# Patient Record
Sex: FEMALE | Race: WHITE | NOT HISPANIC OR LATINO | Employment: UNEMPLOYED | URBAN - METROPOLITAN AREA
[De-identification: names, ages, dates, MRNs, and addresses within clinical notes are randomized per-mention and may not be internally consistent; named-entity substitution may affect disease eponyms.]

---

## 2024-03-10 ENCOUNTER — HOSPITAL ENCOUNTER (EMERGENCY)
Facility: HOSPITAL | Age: 54
Discharge: HOME | End: 2024-03-10
Attending: EMERGENCY MEDICINE | Admitting: EMERGENCY MEDICINE
Payer: COMMERCIAL

## 2024-03-10 ENCOUNTER — APPOINTMENT (OUTPATIENT)
Dept: GENERAL RADIOLOGY | Facility: HOSPITAL | Age: 54
End: 2024-03-10
Payer: COMMERCIAL

## 2024-03-10 VITALS
BODY MASS INDEX: 34.78 KG/M2 | DIASTOLIC BLOOD PRESSURE: 67 MMHG | HEART RATE: 84 BPM | RESPIRATION RATE: 19 BRPM | WEIGHT: 189 LBS | SYSTOLIC BLOOD PRESSURE: 125 MMHG | OXYGEN SATURATION: 95 % | HEIGHT: 62 IN | TEMPERATURE: 98.1 F

## 2024-03-10 DIAGNOSIS — J10.1 INFLUENZA A: Primary | ICD-10-CM

## 2024-03-10 LAB
FLUAV RNA RESP QL NAA+PROBE: DETECTED
FLUBV RNA RESP QL NAA+PROBE: NOT DETECTED
SARS-COV-2 RNA RESP QL NAA+PROBE: NOT DETECTED

## 2024-03-10 PROCEDURE — 71046 X-RAY EXAM CHEST 2 VIEWS: CPT

## 2024-03-10 PROCEDURE — 87636 SARSCOV2 & INF A&B AMP PRB: CPT | Performed by: EMERGENCY MEDICINE

## 2024-03-10 PROCEDURE — 99283 EMERGENCY DEPT VISIT LOW MDM: CPT

## 2024-03-10 RX ORDER — BENZONATATE 200 MG/1
200 CAPSULE ORAL 3 TIMES DAILY PRN
Qty: 20 CAPSULE | Refills: 0 | Status: SHIPPED | OUTPATIENT
Start: 2024-03-10

## 2024-03-10 RX ORDER — BENZONATATE 100 MG/1
200 CAPSULE ORAL ONCE
Status: COMPLETED | OUTPATIENT
Start: 2024-03-10 | End: 2024-03-10

## 2024-03-10 RX ORDER — IBUPROFEN 600 MG/1
600 TABLET ORAL EVERY 6 HOURS PRN
Qty: 20 TABLET | Refills: 0 | Status: SHIPPED | OUTPATIENT
Start: 2024-03-10

## 2024-03-10 RX ADMIN — BENZONATATE 200 MG: 100 CAPSULE ORAL at 21:55

## 2024-03-11 NOTE — ED PROVIDER NOTES
"Subjective     History provided by:  Patient    History of Present Illness    Chief complaint: Shortness of breath and cough    Location: Lungs    Quality/Severity: The patient has been short of breath and has had a nonproductive cough.  She states the cough goes in severe spells.    Timing/Onset: Started Thursday 3 days ago.    Modifying Factors: Exertion exacerbates the shortness of breath.  Coughing exacerbates the shortness of breath.    Associated symptoms: Reports a subjective fever.  Denies runny nose or sore throat.    Narrative: The patient is a 53-year-old white female complaining of a 3-day history of a nonproductive cough and shortness of breath.  She is a pack-a-day smoker for 35 years and states she has a history of asthma for which she takes albuterol inhaler which has not been helping.  She has had the COVID-vaccine back in the early days of COVID and is contracted COVID twice.  She has not been vaccinated against the flu.  She works cleaning houses.    Review of Systems  Past Medical History:   Diagnosis Date    Asthma     Diabetes mellitus, type 2      /67 (BP Location: Right arm, Patient Position: Sitting)   Pulse 84   Temp 98.1 °F (36.7 °C) (Oral)   Resp 19   Ht 157.5 cm (62\")   Wt 85.7 kg (189 lb)   LMP  (LMP Unknown)   SpO2 95%   BMI 34.57 kg/m²     Past Medical History:   Diagnosis Date    Asthma     Diabetes mellitus, type 2        No Known Allergies    Past Surgical History:   Procedure Laterality Date     SECTION      HYSTERECTOMY      OOPHORECTOMY      Removed one ovary with Hysterectomy       Family History   Problem Relation Age of Onset    Lung cancer Father     Lymphoma Maternal Grandmother     Heart disease Maternal Grandfather        Social History     Socioeconomic History    Marital status: Single   Tobacco Use    Smoking status: Every Day     Current packs/day: 1.00     Average packs/day: 1 pack/day for 35.0 years (35.0 ttl pk-yrs)     Types: Cigarettes     " Start date: 3/10/1989   Substance and Sexual Activity    Alcohol use: Never    Drug use: Never    Sexual activity: Defer           Objective   Physical Exam  Vitals and nursing note reviewed.   Constitutional:       General: She is not in acute distress.     Appearance: She is well-developed. She is obese. She is not ill-appearing, toxic-appearing or diaphoretic.      Comments: The patient appears in no acute distress and does not appear ill.  Review of her vital signs: She is afebrile with a temperature 98.1, slightly tachypnea with a respiratory rate of 20 with a normal room air oxygen saturation of 96%, slightly tachycardic with heart rate 108, blood pressure elevated 135/104.   HENT:      Head: Normocephalic and atraumatic.      Nose: Nose normal.      Mouth/Throat:      Mouth: Mucous membranes are moist.      Pharynx: Oropharynx is clear.   Eyes:      General: No scleral icterus.        Right eye: No discharge.         Left eye: No discharge.      Pupils: Pupils are equal, round, and reactive to light.   Neck:      Thyroid: No thyromegaly.      Vascular: No JVD.   Cardiovascular:      Rate and Rhythm: Normal rate and regular rhythm.      Heart sounds: Normal heart sounds. No murmur heard.  Pulmonary:      Effort: Pulmonary effort is normal.      Breath sounds: Normal breath sounds. No decreased breath sounds, wheezing, rhonchi or rales.   Chest:      Chest wall: No tenderness.   Abdominal:      General: Bowel sounds are normal. There is no distension.      Palpations: Abdomen is soft.      Tenderness: There is no abdominal tenderness.   Musculoskeletal:         General: No tenderness or deformity. Normal range of motion.      Cervical back: Normal range of motion and neck supple.   Lymphadenopathy:      Cervical: No cervical adenopathy.   Skin:     General: Skin is warm and dry.      Findings: No rash.   Neurological:      General: No focal deficit present.      Mental Status: She is alert and oriented to  person, place, and time.      Cranial Nerves: No cranial nerve deficit.      Coordination: Coordination normal.      Comments: No focal motor sensory deficit   Psychiatric:         Behavior: Behavior normal.         Thought Content: Thought content normal.         Judgment: Judgment normal.         Procedures           ED Course  ED Course as of 03/10/24 2213   Sun Mar 10, 2024   2145 The radiologist and my interpretation of the patient's chest x-ray was normal.  The patient's COVID PCR is negative.  Influenza PCR is positive for influenza A. [TP]   2149 The patient's been symptomatic for 3 days placing her out of the timeframe to prescribe Tamiflu.  She will be prescribed ibuprofen and Tessalon. [TP]      ED Course User Index  [TP] Vidal Bueno MD                                             Medical Decision Making  My differential diagnosis for cough includes but is not limited to:  Upper respiratory infection, bronchitis, pneumonia, COPD exacerbation, cough variant asthma, cardiac asthma, coronary artery disease, congestive heart failure, bacterial, viral or lung infections, lung cancer, aspiration pneumonitis, aspiration of foreign body and Covid -19, influenza           Problems Addressed:  Influenza A: complicated acute illness or injury    Amount and/or Complexity of Data Reviewed  Labs: ordered. Decision-making details documented in ED Course.  Radiology: ordered. Decision-making details documented in ED Course.    Risk  Prescription drug management.        Final diagnoses:   Influenza A       ED Disposition  ED Disposition       ED Disposition   Discharge    Condition   Stable    Comment   --               Simon Palafox MD  [address] [place] [id number]  [phone]    Schedule an appointment as soon as possible for a visit in 1 week  If not improved         Medication List        New Prescriptions      benzonatate 200 MG capsule  Commonly known as: TESSALON  Take 1 capsule by mouth 3  (Three) Times a Day As Needed for Cough.     ibuprofen 600 MG tablet  Commonly known as: ADVIL,MOTRIN  Take 1 tablet by mouth Every 6 (Six) Hours As Needed for Moderate Pain or Fever for up to 20 doses.               Where to Get Your Medications        These medications were sent to [facility] [place] [id number]  [phone] [facility] [phone] [facility] [address]      Phone: [phone]   benzonatate 200 MG capsule  ibuprofen 600 MG tablet           Labs Reviewed   COVID-19 AND FLU A/B, NP SWAB IN TRANSPORT MEDIA 1 HR TAT - Abnormal; Notable for the following components:       Result Value    Influenza A PCR Detected (*)     All other components within normal limits    Narrative:     Fact sheet for providers: https://www.fda.gov/media/392160/download    Fact sheet for patients: https://www.fda.gov/media/026874/download    Test performed by PCR.     XR Chest 2 View   Final Result   Impression:   No acute cardiopulmonary process.         Electronically Signed: Nidia Todd MD     3/10/2024 9:24 PM EDT     Workstation ID: [id number]             Medication List        New Prescriptions      benzonatate 200 MG capsule  Commonly known as: TESSALON  Take 1 capsule by mouth 3 (Three) Times a Day As Needed for Cough.     ibuprofen 600 MG tablet  Commonly known as: ADVIL,MOTRIN  Take 1 tablet by mouth Every 6 (Six) Hours As Needed for Moderate Pain or Fever for up to 20 doses.               Where to Get Your Medications        These medications were sent to [address] [place] [id number] [facility]  [phone] [facility] [phone] FX  [facility] [address]      Phone: [phone]   benzonatate 200 MG capsule  ibuprofen 600 MG tablet              Vidal Bueno MD  03/10/24 4177